# Patient Record
Sex: FEMALE | Race: WHITE | NOT HISPANIC OR LATINO | URBAN - NONMETROPOLITAN AREA
[De-identification: names, ages, dates, MRNs, and addresses within clinical notes are randomized per-mention and may not be internally consistent; named-entity substitution may affect disease eponyms.]

---

## 2020-08-12 ENCOUNTER — OFFICE VISIT CONVERTED (OUTPATIENT)
Dept: FAMILY MEDICINE CLINIC | Age: 20
End: 2020-08-12
Attending: NURSE PRACTITIONER

## 2020-12-30 ENCOUNTER — OFFICE VISIT CONVERTED (OUTPATIENT)
Dept: FAMILY MEDICINE CLINIC | Age: 20
End: 2020-12-30
Attending: FAMILY MEDICINE

## 2021-05-18 NOTE — PROGRESS NOTES
Josette Joshi  2000     Office/Outpatient Visit    Visit Date: Wed, Aug 12, 2020 09:15 am    Provider: Karen Carpio N.P. (Assistant: Yaquelin Armendariz RN)    Location: [facility]        Electronically signed by Karen Carpio N.P. on  08/12/2020 10:03:14 AM                             Subjective:        CC: Ms. Joshi is a 19 year old White female.  This is her first visit to the clinic.  Establish New PCP;         HPI:           PHQ-9 Depression Screening: Completed form scanned and in chart; Total Score 0           Dx with encounter for general adult medical examination without abnormal findings; her last menstrual period was 8/06/2020.  Her current method of contraception is condoms.  She does not perform breast self-exams.  She is current with her HPV.  She is not current with influenza immunization.  Tobacco: She has never smoked.      ROS:     CONSTITUTIONAL:  Negative for chills and fever.      EYES:  Negative for eye drainage and eye pain.      E/N/T:  Negative for ear pain and sore throat.      CARDIOVASCULAR:  Negative for chest pain and palpitations.      RESPIRATORY:  Positive for hx asthma with rare use of inhaler, exacerbations mostly occur in the winter, she needs referral on inhaler, no current exacerbation.   Negative for dyspnea or frequent wheezing.      GASTROINTESTINAL:  Negative for abdominal pain and vomiting.      GENITOURINARY:  Negative for dysuria and frequent urination.      MUSCULOSKELETAL:  Negative for joint stiffness and myalgias.      INTEGUMENTARY/BREAST:  Negative for rash.      NEUROLOGICAL:  Negative for dizziness and headaches.      PSYCHIATRIC:  Negative for depression and suicidal thoughts.          Past Medical History / Family History / Social History:         Last Reviewed on 8/12/2020 09:49 AM by Karen Carpio    Past Medical History:                 PAST MEDICAL HISTORY         Asthma: dx'd at age 4; mild severity;         GYNECOLOGICAL HISTORY:     G0    Menarche occurred at age 11.          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          Substance Abuse History:     Last Reviewed on 3/18/2017 12:55 PM by Paty Leon        Mental Health History:     Last Reviewed on 3/18/2017 12:55 PM by Paty Leon        Communicable Diseases (eg STDs):     Last Reviewed on 3/18/2017 12:55 PM by Paty Leon        Current Problems:     Last Reviewed on 3/18/2017 12:55 PM by Paty Leon    Unspecified asthma, uncomplicated    Encounter for screening for depression        Immunizations:     Gardasil 9 (HPV9) 3/18/2017    Gardasil 9 (HPV9) 5/18/2017    Gardasil 9 (HPV9) 9/20/2017        Allergies:     Last Reviewed on 7/01/2017 10:40 AM by Camila Keen    Penicillins:          Current Medications:     Last Reviewed on 8/12/2020 09:18 AM by Yaquelin Armendariz HFA 90mcg/1actuation Oral Inhaler [Inhale 2 puff(s) by mouth 4 times a day as needed]        Objective:        Vitals:         Historical:     7/1/2017  BP:   128/87 mm Hg ( (left arm, , sitting, );) 7/1/2017  P:   83bpm ( (left arm (BP Cuff), , sitting, );) 7/1/2017  Wt:   121lbs ( (50.68%))     Current: 8/12/2020 9:21:29 AM    Ht:  5 ft, 3 in (30.53%);  Wt: 142.4 lbs (72.30%);  BMI: 25.2T: 98 F (temporal);  BP: 133/85 mm Hg (left arm, sitting);  P: 101 bpm (left arm (BP Cuff), sitting)        Repeat:     10:0:4 AM  BP:   128/80mm Hg10:0:10 AM  P:   98bpm    Exams:     PHYSICAL EXAM:     GENERAL: well developed, well nourished;  no  apparent distress;     EYES: PERRL, EOMI     E/N/T: EARS: external auditory canal normal;  bilateral TMs are normal;  NOSE: normal turbinates; no sinus tenderness; OROPHARYNX: oral mucosa is normal; posterior pharynx shows no exudate;     NECK: range of motion is normal; trachea is midline;     RESPIRATORY: normal respiratory rate and pattern with no distress; normal breath sounds with no rales, rhonchi, wheezes or rubs;     CARDIOVASCULAR: normal rate; rhythm is regular;     GASTROINTESTINAL: nontender; normal bowel sounds; no organomegaly;     MUSCULOSKELETAL: normal gait; normal range of motion of all major muscle groups; no limb or joint pain with range of motion;     NEUROLOGIC: mental status: alert and oriented x 3; GROSSLY INTACT     PSYCHIATRIC: appropriate affect and demeanor; normal speech pattern; normal thought and perception;         Assessment:         Z00.00   Encounter for general adult medical examination without abnormal findings       Z13.31   Encounter for screening for depression       J45.20   Mild intermittent asthma, uncomplicated           ORDERS:         Meds Prescribed:       [New Rx] albuterol sulfate 90 mcg/actuation Inhalation HFA Aerosol Inhaler [inhale 1 - 2 puffs (90 - 180 mcg) by inhalation route every 4 hours as needed], #18 (eighteen) grams, Refills: 2 (two)         Other Orders:         Depression screen negative  (In-House)                      Plan:         Encounter for general adult medical examination without abnormal findings        COUNSELING was provided today regarding the following topics: healthy eating habits, regular exercise, breast self-exam, STD prevention, and Advised to see dentist regularly.      FOLLOW-UP: Schedule a follow-up visit in 12 months.          Encounter for screening for depression    MIPS PHQ-9 Depression Screening: Completed form scanned and in chart; Total Score 0; Negative Depression Screen           Orders:         Depression screen  negative  (In-House)              Mild intermittent asthma, uncomplicatedStable. Rescue inhaler refilled in the event that she needs.     Asthma well controlled Yes           Prescriptions:       [New Rx] albuterol sulfate 90 mcg/actuation Inhalation HFA Aerosol Inhaler [inhale 1 - 2 puffs (90 - 180 mcg) by inhalation route every 4 hours as needed], #18 (eighteen) grams, Refills: 2 (two)             Patient Recommendations:        For  Encounter for general adult medical examination without abnormal findings:        Limit dietary intake of fat (especially saturated fat) and cholesterol.  Eat a variety of foods, including plenty of fruits, vegetables, and grain containg fiber, limit fat intake to 30% of total calories. Balance caloric intake with energy expended.    Maintaining regular physical activity is advised to help prevent heart disease, hypertension, diabetes, and obesity.    You should regularly examine your breasts, easily done while in the shower or with lotion.  Feel and look for differences in consistency from month to month, especially noting knots or lumps, changes in skin appearance, nipple retraction or discharge.    Sexually transmitted diseases may be prevented by abstaining from sexual activity or avoiding sexual contact with high risk partners, and consistently using a condom or female barrier contraceptives plus spermacide.  Schedule a follow-up visit in 12 months.              Charge Capture:         Primary Diagnosis:     Z00.00  Encounter for general adult medical examination without abnormal findings           Orders:      [id number]  Preventive medicine, established patient, age 18-39 years  (In-House)              Z13.31  Encounter for screening for depression           Orders:        Depression screen negative  (In-House)              J45.20  Mild intermittent asthma, uncomplicated

## 2021-05-18 NOTE — PROGRESS NOTES
Josette Joshi  2000     Office/Outpatient Visit    Visit Date: Wed, Dec 30, 2020 01:12 pm    Provider: Bradley Bermudez MD (Assistant: Naima Castillo, )    Location: [facility]        Electronically signed by Bradley Bermudez MD on  12/30/2020 02:04:23 PM                             Subjective:        CC: Ms. Joshi is a 20 year old White female.  runny nose, congestion;         HPI:           Patient complains of acute upper respiratory infection, unspecified.  These have been present for the past 6 days.  The symptoms include cough, ear pain,  headache, nasal congestion and nasal discharge.  She denies body aches, chest congestion, Chills, fever or sinus pain/pressure.  She denies exposure to ill contacts.  She has already tried to relieve the symptoms with ibuprofen, albuterol, and Dayquil and Nyquil.  Medical history is significant for asthma.      ROS:     CONSTITUTIONAL:  Negative for chills, fatigue and fever.      E/N/T:  Positive for ear pain ( right ), nasal congestion, frequent rhinorrhea and sore throat.   Negative for tinnitus or sinus pressure.      CARDIOVASCULAR:  Negative for chest pain, dizziness, palpitations and edema.      RESPIRATORY:  Positive for cough.   Negative for dyspnea.      GASTROINTESTINAL:  Negative for abdominal pain, diarrhea, nausea and vomiting.      MUSCULOSKELETAL:  Negative for arthralgias and myalgias.      INTEGUMENTARY/BREAST:  Negative for rash, breast mass and skin changes of breast.      NEUROLOGICAL:  Positive for headaches.   Negative for paresthesias or weakness.          Past Medical History / Family History / Social History:         Last Reviewed on 12/30/2020 02:04 PM by Bradley Bermudez    Past Medical History:                 PAST MEDICAL HISTORY         Asthma: dx'd at age 4; mild severity;         GYNECOLOGICAL HISTORY:    G0    Menarche occurred at age 11.          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          Substance Abuse History:     Last Reviewed on 12/30/2020 02:04 PM by Bradley Bermudez        Mental Health History:     Last Reviewed on 12/30/2020 02:04 PM by Bradley Bermudez        Communicable Diseases (eg STDs):     Last Reviewed on 12/30/2020 02:04 PM by Bradley Bermudez        Current Problems:     Last Reviewed on 12/30/2020 02:04 PM by Bradley Bermudez    Unspecified asthma, uncomplicated    Encounter for general adult medical examination without abnormal findings    Encounter for screening for depression    Mild intermittent asthma, uncomplicated    Acute upper respiratory infection, unspecified        Immunizations:     Gardasil 9 (HPV9) 3/18/2017    Gardasil 9 (HPV9) 5/18/2017    Gardasil 9 (HPV9) 9/20/2017        Allergies:     Last Reviewed on 12/30/2020 02:04 PM by Bradley Bermudez    Penicillins:          Current Medications:     Last Reviewed on 12/30/2020 02:04 PM by Bradley Bermudez    albuterol 90 mcg/actuation Inhalation Aerosol [INHALE ONE OR TWO PUFFS BY MOUTH EVERY 4 HOURS AS NEEDED]        Objective:        Vitals:         Current: 12/30/2020 1:15:07 PM    Ht:  5 ft, 3 in;  Wt: 142.3 lbs;  BMI: 25.2T: 97.1 F (temporal);  BP: 132/90 mm Hg (left arm, sitting);  P: 96 bpm (left arm (BP Cuff), sitting)O2 Sat: 99 % (room air)        Exams:     PHYSICAL EXAM:     GENERAL: vital signs recorded - well developed, well nourished;  no apparent distress;     EYES: conjunctiva and cornea are normal;     E/N/T: EARS: both TMs are have fluid behind them;  NOSE: no sinus tenderness; OROPHARYNX:  "posterior pharynx shows no exudate and erythema;     RESPIRATORY: Clear to auscultation bilaterally; no rales (\"crackles\") present; no rhonchi; no wheezes;     CARDIOVASCULAR: normal rate; rhythm is regular;  No murmurs. clicks, gallops or rubs appreciated; no edema;     LYMPHATIC: bilateral anterior cervical nodes;     BREAST/INTEGUMENT: No significant rashes, lesions or suspicious moles within limits of examination;     NEUROLOGIC: Grossly intact; mental status: alert and oriented x 3;     PSYCHIATRIC: appropriate affect and demeanor; normal speech pattern; Normal behavior;         Assessment:         J06.9   Acute upper respiratory infection, unspecified           ORDERS:         Meds Prescribed:       [New Rx] promethazine-DM 6.25-15 mg/5 mL oral Syrup [take 5 milliliters by oral route every 4 hours as needed, not to exceed 30 mL in 24 hours], #118 (one hundred and eighteen) milliliters, Refills: 0 (zero)                 Plan:         Acute upper respiratory infection, unspecified- Appears to be viral in nature. Promethazine DM provided for cough and decongestion. Tylenol and/or Motrin as needed. Delayed antibiotic prescription provided for cefdinir to be used if symptom duration extends past 7 to 10 days or if overt bacterial signs develop. Return to clinic for persistent or worsening symptoms.          Prescriptions:       [New Rx] promethazine-DM 6.25-15 mg/5 mL oral Syrup [take 5 milliliters by oral route every 4 hours as needed, not to exceed 30 mL in 24 hours], #118 (one hundred and eighteen) milliliters, Refills: 0 (zero)             Charge Capture:         Primary Diagnosis:     J06.9  Acute upper respiratory infection, unspecified           Orders:      [id number]  Office/outpatient visit; established patient, level 3  (In-House)                     "

## 2021-07-02 VITALS
HEIGHT: 63 IN | SYSTOLIC BLOOD PRESSURE: 128 MMHG | BODY MASS INDEX: 25.23 KG/M2 | HEART RATE: 98 BPM | TEMPERATURE: 98 F | DIASTOLIC BLOOD PRESSURE: 80 MMHG | WEIGHT: 142.4 LBS

## 2021-07-02 VITALS
TEMPERATURE: 97.1 F | HEART RATE: 96 BPM | OXYGEN SATURATION: 99 % | HEIGHT: 63 IN | DIASTOLIC BLOOD PRESSURE: 90 MMHG | SYSTOLIC BLOOD PRESSURE: 132 MMHG | BODY MASS INDEX: 25.21 KG/M2 | WEIGHT: 142.3 LBS